# Patient Record
Sex: FEMALE | Race: WHITE
[De-identification: names, ages, dates, MRNs, and addresses within clinical notes are randomized per-mention and may not be internally consistent; named-entity substitution may affect disease eponyms.]

---

## 2019-06-20 ENCOUNTER — HOSPITAL ENCOUNTER (EMERGENCY)
Dept: HOSPITAL 91 - FTE | Age: 19
Discharge: HOME | End: 2019-06-20
Payer: COMMERCIAL

## 2019-06-20 ENCOUNTER — HOSPITAL ENCOUNTER (EMERGENCY)
Dept: HOSPITAL 10 - FTE | Age: 19
Discharge: HOME | End: 2019-06-20
Payer: COMMERCIAL

## 2019-06-20 VITALS
BODY MASS INDEX: 21.09 KG/M2 | WEIGHT: 119.05 LBS | HEIGHT: 63 IN | BODY MASS INDEX: 21.09 KG/M2 | WEIGHT: 119.05 LBS | HEIGHT: 63 IN

## 2019-06-20 VITALS — DIASTOLIC BLOOD PRESSURE: 69 MMHG | SYSTOLIC BLOOD PRESSURE: 123 MMHG | RESPIRATION RATE: 20 BRPM | HEART RATE: 89 BPM

## 2019-06-20 DIAGNOSIS — N39.0: ICD-10-CM

## 2019-06-20 DIAGNOSIS — R51: Primary | ICD-10-CM

## 2019-06-20 LAB
ADD MAN DIFF?: NO
ADD UMIC: YES
ANION GAP: 19 (ref 5–13)
BASOPHIL #: 0 10^3/UL (ref 0–0.1)
BASOPHILS %: 0.4 % (ref 0–2)
BLOOD UREA NITROGEN: 7 MG/DL (ref 7–20)
CALCIUM: 9.7 MG/DL (ref 8.4–10.2)
CARBON DIOXIDE: 20 MMOL/L (ref 21–31)
CHLORIDE: 104 MMOL/L (ref 97–110)
CREATININE: 0.63 MG/DL (ref 0.44–1)
EOSINOPHILS #: 0 10^3/UL (ref 0–0.5)
EOSINOPHILS %: 0 % (ref 0–7)
GLUCOSE: 89 MG/DL (ref 70–220)
HEMATOCRIT: 46.6 % (ref 37–47)
HEMOGLOBIN: 15.3 G/DL (ref 12–16)
IMMATURE GRANS #M: 0.06 10^3/UL (ref 0–0.03)
IMMATURE GRANS % (M): 0.6 % (ref 0–0.43)
LYMPHOCYTES #: 1.1 10^3/UL (ref 0.8–2.9)
LYMPHOCYTES %: 10.2 % (ref 18–55)
MEAN CORPUSCULAR HEMOGLOBIN: 30 PG (ref 29–33)
MEAN CORPUSCULAR HGB CONC: 32.8 G/DL (ref 32–37)
MEAN CORPUSCULAR VOLUME: 91.4 FL (ref 72–104)
MEAN PLATELET VOLUME: 10.1 FL (ref 7.4–10.4)
MONOCYTE #: 0.8 10^3/UL (ref 0.3–0.9)
MONOCYTES %: 7.8 % (ref 0–13)
NEUTROPHIL #: 8.6 10^3/UL (ref 1.6–7.5)
NEUTROPHILS %: 81 % (ref 30–74)
NUCLEATED RED BLOOD CELLS #: 0 10^3/UL (ref 0–0)
NUCLEATED RED BLOOD CELLS%: 0 /100WBC (ref 0–0)
PLATELET COUNT: 248 10^3/UL (ref 140–415)
POTASSIUM: 3.6 MMOL/L (ref 3.5–5.1)
RED BLOOD COUNT: 5.1 10^6/UL (ref 4.2–5.4)
RED CELL DISTRIBUTION WIDTH: 12.3 % (ref 11.5–14.5)
SODIUM: 143 MMOL/L (ref 135–144)
UR ASCORBIC ACID: NEGATIVE MG/DL
UR BACTERIA: (no result) /HPF
UR BILIRUBIN (DIP): NEGATIVE MG/DL
UR BLOOD (DIP): (no result) MG/DL
UR CLARITY: (no result)
UR COLOR: (no result)
UR GLUCOSE (DIP): NEGATIVE MG/DL
UR KETONES (DIP): (no result) MG/DL
UR LEUKOCYTE ESTERASE (DIP): (no result) LEU/UL
UR MUCUS: (no result) /HPF
UR NITRITE (DIP): NEGATIVE MG/DL
UR PH (DIP): 5 (ref 5–9)
UR RBC: 47 /HPF (ref 0–5)
UR SPECIFIC GRAVITY (DIP): 1.03 (ref 1–1.03)
UR SQUAMOUS EPITHELIAL CELL: (no result) /HPF
UR TOTAL PROTEIN (DIP): (no result) MG/DL
UR UROBILINOGEN (DIP): NEGATIVE MG/DL
UR WBC: 17 /HPF (ref 0–5)
WHITE BLOOD COUNT: 10.6 10^3/UL (ref 4.8–10.8)

## 2019-06-20 PROCEDURE — 96372 THER/PROPH/DIAG INJ SC/IM: CPT

## 2019-06-20 PROCEDURE — 99284 EMERGENCY DEPT VISIT MOD MDM: CPT

## 2019-06-20 PROCEDURE — 81025 URINE PREGNANCY TEST: CPT

## 2019-06-20 PROCEDURE — 80048 BASIC METABOLIC PNL TOTAL CA: CPT

## 2019-06-20 PROCEDURE — 81001 URINALYSIS AUTO W/SCOPE: CPT

## 2019-06-20 PROCEDURE — 85025 COMPLETE CBC W/AUTO DIFF WBC: CPT

## 2019-06-20 RX ADMIN — ONDANSETRON 1 MG: 4 TABLET, ORALLY DISINTEGRATING ORAL at 12:48

## 2019-06-20 RX ADMIN — LORAZEPAM 1 MG: 1 TABLET ORAL at 12:48

## 2019-06-20 RX ADMIN — KETOROLAC TROMETHAMINE 1 MG: 15 INJECTION, SOLUTION INTRAMUSCULAR; INTRAVENOUS at 12:49

## 2019-06-20 NOTE — ERD
ER Documentation


Chief Complaint


Chief Complaint





C/O H/A WITH FEVER, BOTH EAR PAIN FOR 4 DAYS





HPI


18-year-old female presents emergency department complaining of intermittent 


headache for the past 4 days.  Patient does have history of anxiety in the past 


but does not take medication for it.  Headache is localized to the occipital r


egion and radiates down her right neck.  Symptoms onset suddenly.  Ibuprofen 


alleviate symptoms.  She also reports feeling significant symptoms of anxiety 


due to school and family and work stress.  She denies any photophobia, suicidal 


ideation, homicidal ideation, chest pain, shortness of breath, or other symptoms


at this time.





ROS


All systems reviewed and are negative except as per history of present illness.





Medications


Home Meds


Active Scripts


Hydroxyzine Hcl* (Hydroxyzine Hcl*) 25 Mg Tablet, 25 MG PO Q8H PRN for ANXIETY, 


#30 TAB


   Prov:JORDEN LIM PA-C         6/20/19


Naproxen* (Naprosyn*) 500 Mg Tablet, 500 MG PO BID PRN for PAIN AND/OR 


INFLAMMATION, #30 TAB


   Prov:JORDEN LIM PA-C         6/20/19


Cephalexin* (Keflex*) 500 Mg Capsule, 500 MG PO QID for 5 Days, CAP


   Prov:JORDEN LIM PA-C         6/20/19





Allergies


Allergies:  


Coded Allergies:  


     No Known Allergy (Unverified , 6/20/19)





PMhx/Soc


Medical and Surgical Hx:  pt denies Medical Hx, pt denies Surgical Hx


Hx Alcohol Use:  No


Hx Substance Use:  No


Hx Tobacco Use:  No


Smoking Status:  Never smoker





FmHx


Family History:  No diabetes





Physical Exam


Vitals





Vital Signs


  Date      Temp  Pulse  Resp  B/P (MAP)   Pulse Ox  O2          O2 Flow    FiO2


Time                                                 Delivery    Rate


   6/20/19  98.8     89    20      123/69        99  Room Air


     13:00                           (87)


   6/20/19  98.8    105    20      134/80        99


     11:35                           (98)





Physical Exam


Const:   No acute distress


Head:   Atraumatic 


Eyes:    Normal Conjunctiva


ENT:    Normal External Ears, Nose and Mouth.  Mild erythema to the tympanic 


membranes bilaterally.  Posterior pharynx is clear.  Uvula is midline.


Neck:               Full range of motion. No meningismus.


Resp:   Clear to auscultation bilaterally


Cardio:   Regular rate and rhythm, no murmurs


Abd:    Soft, non tender, non distended. Normal bowel sounds


Skin:   No petechiae or rashes


Back:   No midline or flank tenderness


Ext:    No cyanosis, or edema


Neur:   Awake and alert.  No neurological deficits.


Psych:    Anxious.  Denies homicidal or suicidal ideation.


Result Diagram:  


6/20/19 1255                                                                    


           6/20/19 1255





Results 24 hrs





Laboratory Tests


Test
                             6/20/19
12:43    6/20/19
12:44   6/20/19
12:55


POC Beta HCG, Qualitative         NEGATIVE


Urine Color                                      CHANI


Urine Clarity                                    CLOUDY


Urine pH                                                    5.0


Urine Specific Gravity                                    1.029


Urine Ketones                                          2+ mg/dL


Urine Nitrite                                    NEGATIVE mg/dL


Urine Bilirubin                                  NEGATIVE mg/dL


Urine Urobilinogen                               NEGATIVE mg/dL


Urine Leukocyte Esterase                         TRACE Neva/ul


Urine Microscopic RBC                                   47 /HPF


Urine Microscopic WBC                                   17 /HPF


Urine Squamous Epithelial
Cells   
              FEW /HPF 
       



Urine Bacteria                                   FEW /HPF


Urine Mucus                                      MANY /HPF


Urine Hemoglobin                                       2+ mg/dL


Urine Glucose                                    NEGATIVE mg/dL


Urine Total Protein                                    2+ mg/dl


White Blood Count                                                  10.6 10^3/ul


Red Blood Count                                                    5.10 10^6/ul


Hemoglobin                                                            15.3 g/dl


Hematocrit                                                               46.6 %


Mean Corpuscular Volume                                                 91.4 fl


Mean Corpuscular Hemoglobin                                             30.0 pg


Mean Corpuscular                  
              
                   32.8 g/dl 



Hemoglobin
Concent


Red Cell Distribution Width                                              12.3 %


Platelet Count                                                      248 10^3/UL


Mean Platelet Volume                                                    10.1 fl


Immature Granulocytes %                                                 0.600 %


Neutrophils %                                                            81.0 %


Lymphocytes %                                                            10.2 %


Monocytes %                                                               7.8 %


Eosinophils %                                                             0.0 %


Basophils %                                                               0.4 %


Nucleated Red Blood Cells %                                         0.0 /100WBC


Immature Granulocytes #                                           0.060 10^3/ul


Neutrophils #                                                       8.6 10^3/ul


Lymphocytes #                                                       1.1 10^3/ul


Monocytes #                                                         0.8 10^3/ul


Eosinophils #                                                       0.0 10^3/ul


Basophils #                                                         0.0 10^3/ul


Nucleated Red Blood Cells #                                         0.0 10^3/ul


Sodium Level                                                         143 mmol/L


Potassium Level                                                      3.6 mmol/L


Chloride Level                                                       104 mmol/L


Carbon Dioxide Level                                                  20 mmol/L


Anion Gap                                                                    19


Blood Urea Nitrogen                                                     7 mg/dl


Creatinine                                                           0.63 mg/dl


Est Glomerular Filtrat            
              
                > 60 mL/min 



Rate
mL/min


Glucose Level                                                          89 mg/dl


Calcium Level                                                         9.7 mg/dl





Current Medications


 Medications
   Dose
          Sig/Lester
       Start Time
   Status  Last


 (Trade)       Ordered        Route
 PRN     Stop Time              Admin
Dose


                              Reason                                Admin


 Lorazepam
     1 mg           ONCE  ONCE
    6/20/19       DC           6/20/19


(Ativan)                      PO
            12:30
                       12:48



                                             6/20/19 12:31


 Ketorolac
     15 mg          ONCE  STAT
    6/20/19       DC           6/20/19


Tromethamine
                 IM
            12:29
                       12:49



 (Toradol)                                   6/20/19 12:30


 Ondansetron    4 mg           ONCE  STAT
    6/20/19       DC           6/20/19


HCl
  (Zofran                 ODT
           12:30
                       12:48



Odt)                                         6/20/19 12:31








Procedures/MDM


18-year-old female presents the emergency department with complaints of headache


and anxiety.  Patient was also found to have urinary tract infection on 


urinalysis. Urine pregnancy was negative.  Patient was administered Ativan and 


ibuprofen in the department with significant improvement of her symptoms.  On 


reevaluation she was improved and was no longer anxious.  Her pulse improved as 


well as she was initially tachycardic.  No evidence to suggest acute coronary 


syndrome, pneumothorax, pulmonary embolism, serious bacterial infection, sepsis,


or other emergencies.  Patient will be discharged home with prescriptions to 


further treat her symptoms.  She was advised to return to the department immed


iately for any new or worsening or concerning symptoms.  She understands and 


agrees with the plan.





Departure


Diagnosis:  


   Primary Impression:  


   Headache


   Additional Impression:  


   UTI (urinary tract infection)


Condition:  Fair


Patient Instructions:  Understanding Urinary Tract Infections (UTIs), Self-Care 


for Headaches


Referrals:  


[facility]


YOU HAVE RECEIVED A MEDICAL SCREENING EXAM AND THE RESULTS INDICATE THAT YOU DO 


NOT HAVE A CONDITION THAT REQUIRES URGENT TREATMENT IN THE EMERGENCY DEPARTMENT.





FURTHER EVALUATION AND TREATMENT OF YOUR CONDITION CAN WAIT UNTIL YOU ARE SEEN 


IN YOUR DOCTORS OFFICE WITHIN THE NEXT 1-2 DAYS. IT IS YOUR RESPONSIBILITY TO 


MAKE AN APPOINTMENT FOR FOLOW-UP CARE.





IF YOU HAVE A PRIMARY DOCTOR


--you should call your primary doctor and schedule an appointment





IF YOU DO NOT HAVE A PRIMARY DOCTOR YOU CAN CALL OUR PHYSICIAN REFERRAL HOTLINE 


AT


 [phone] 





IF YOU CAN NOT AFFORD TO SEE A PHYSICIAN YOU CAN CHOSE FROM THE FOLLOWING 


[facility]





[facility] [phone] [address] [facility]. [facility] [phone] [address] [facility]. [facility] [phone] [address] [facility] [phone] [address]. [facility] [phone] [address] [facility]. [facility] [phone] [address]





Additional Instructions:  


Call your primary care doctor TOMORROW for an appointment during the next 1-2 


days.See the doctor sooner or return here if your condition worsens before your 


appointment time.











JORDEN LIM PA-C       Jun 20, 2019 21:40